# Patient Record
Sex: FEMALE | Employment: UNEMPLOYED | URBAN - METROPOLITAN AREA
[De-identification: names, ages, dates, MRNs, and addresses within clinical notes are randomized per-mention and may not be internally consistent; named-entity substitution may affect disease eponyms.]

---

## 2017-08-04 ENCOUNTER — TELEPHONE (OUTPATIENT)
Dept: TRANSPLANT | Facility: CLINIC | Age: 57
End: 2017-08-04

## 2017-08-04 DIAGNOSIS — Z00.5 TRANSPLANT DONOR EVALUATION: Primary | ICD-10-CM

## 2017-08-04 NOTE — TELEPHONE ENCOUNTER
Sarah Guerrero called and stated that she is interested in becoming a living donor for her brother, Bello Guerrero.  Medical and social history obtained.  No contraindications noted.  All questions answered.  Crossmatch has been scheduled.

## 2017-08-08 ENCOUNTER — LAB VISIT (OUTPATIENT)
Dept: LAB | Facility: HOSPITAL | Age: 57
End: 2017-08-08
Attending: NURSE PRACTITIONER
Payer: MEDICARE

## 2017-08-08 DIAGNOSIS — Z00.5 TRANSPLANT DONOR EVALUATION: ICD-10-CM

## 2017-08-08 LAB — ABO + RH BLD: NORMAL

## 2017-08-08 PROCEDURE — 81373 HLA I TYPING 1 LOCUS LR: CPT | Mod: 91,TXP

## 2017-08-08 PROCEDURE — 81376 HLA II TYPING 1 LOCUS LR: CPT | Mod: TXP

## 2017-08-08 PROCEDURE — 86900 BLOOD TYPING SEROLOGIC ABO: CPT | Mod: TXP

## 2017-08-08 PROCEDURE — 81376 HLA II TYPING 1 LOCUS LR: CPT | Mod: 91,TXP

## 2017-08-08 PROCEDURE — 36415 COLL VENOUS BLD VENIPUNCTURE: CPT | Mod: PO,TXP

## 2017-08-08 PROCEDURE — 81373 HLA I TYPING 1 LOCUS LR: CPT | Mod: TXP

## 2017-08-08 PROCEDURE — 86901 BLOOD TYPING SEROLOGIC RH(D): CPT | Mod: TXP

## 2017-08-21 LAB — HLATY INTERPRETATION: NORMAL

## 2017-08-22 ENCOUNTER — TELEPHONE (OUTPATIENT)
Dept: TRANSPLANT | Facility: CLINIC | Age: 57
End: 2017-08-22

## 2017-08-22 NOTE — TELEPHONE ENCOUNTER
Patient notified that the results of the crossmatch with her brother shows that they are compatible. Explained that she will be a backup at this time and called if needed to proceed with testing. All questions were answered and patient verbalized understanding.

## 2017-08-28 LAB
HLA DRB4 1: NORMAL
HLA SSO DNA TYPING CLASS I & II INTERPRETATION: NORMAL
HLA-A 1 SERO. EQUIV: 2
HLA-A 1: NORMAL
HLA-A 2 SERO. EQUIV: NORMAL
HLA-A 2: NORMAL
HLA-B 1 SERO. EQUIV: 18
HLA-B 1: NORMAL
HLA-B 2 SERO. EQUIV: 44
HLA-B 2: NORMAL
HLA-BW 1 SERO. EQUIV: 6
HLA-BW 2 SERO. EQUIV: 4
HLA-C 1: NORMAL
HLA-C 2: NORMAL
HLA-CW 1 SERO. EQUIV: 5
HLA-CW 2 SERO. EQUIV: NORMAL
HLA-DQ 1 SERO. EQUIV: 4
HLA-DQ 2 SERO. EQUIV: 5
HLA-DQB1 1: NORMAL
HLA-DQB1 2: NORMAL
HLA-DRB1 1 SERO. EQUIV: 8
HLA-DRB1 1: NORMAL
HLA-DRB1 2 SERO. EQUIV: 14
HLA-DRB1 2: NORMAL
HLA-DRB3 1: NORMAL
HLA-DRB3 2: NORMAL
HLA-DRB345 1 SERO. EQUIV: 52
HLA-DRB345 2 SERO. EQUIV: NORMAL
HLA-DRB4 2: NORMAL
HLA-DRB5 1: NORMAL
HLA-DRB5 2: NORMAL
SSDQB TESTING DATE: NORMAL
SSDRB TESTING DATE: NORMAL
SSOA TESTING DATE: NORMAL
SSOB TESTING DATE: NORMAL
SSOC TESTING DATE: NORMAL
SSODR TESTING DATE: NORMAL
TYSSO TESTING DATE: NORMAL

## 2017-09-19 ENCOUNTER — TELEPHONE (OUTPATIENT)
Dept: TRANSPLANT | Facility: CLINIC | Age: 57
End: 2017-09-19

## 2017-09-19 DIAGNOSIS — Z00.5 TRANSPLANT DONOR EVALUATION: Primary | ICD-10-CM

## 2017-09-19 NOTE — TELEPHONE ENCOUNTER
Patient informed that the other donor was not approved. States she would like to proceed with the medical evaluation. Required testing discussed and information provided to schedule appointments. Patient will obtain results of colonoscopy.   All questions were answered and patient verbalized understanding.

## 2017-10-18 ENCOUNTER — APPOINTMENT (OUTPATIENT)
Dept: LAB | Facility: HOSPITAL | Age: 57
End: 2017-10-18
Attending: NURSE PRACTITIONER
Payer: MEDICARE

## 2017-10-18 ENCOUNTER — HOSPITAL ENCOUNTER (OUTPATIENT)
Dept: RADIOLOGY | Facility: HOSPITAL | Age: 57
Discharge: HOME | End: 2017-10-18
Attending: NURSE PRACTITIONER
Payer: MEDICARE

## 2017-10-18 DIAGNOSIS — Z00.5 TRANSPLANT DONOR EVALUATION: ICD-10-CM

## 2017-10-18 PROCEDURE — 86698 HISTOPLASMA ANTIBODY: CPT | Mod: TXP

## 2017-10-18 PROCEDURE — 71020 XR CHEST PA AND LATERAL: CPT | Mod: 26,TXP,, | Performed by: RADIOLOGY

## 2017-10-18 PROCEDURE — 71020 XR CHEST PA AND LATERAL: CPT | Mod: TC,PO,TXP

## 2017-10-18 PROCEDURE — 86612 BLASTOMYCES ANTIBODY: CPT | Mod: TXP

## 2017-10-25 ENCOUNTER — TELEPHONE (OUTPATIENT)
Dept: TRANSPLANT | Facility: CLINIC | Age: 57
End: 2017-10-25

## 2017-10-25 NOTE — TELEPHONE ENCOUNTER
Lab results reviewed with Dr. Watson (discussed with Dr. Hensley). Patient not a candidate for donation at this time due to pre-diabetes (Hgba1c 6.1), Hypertension, and obesity.    Patient notified of test results. Informed that she will need to lose 15-20# and have improved glucose tolerance in order to continue testing. Also informed that her potassium was low and the physicians advise that her HCTZ be stopped. Will send patient a copy of testing to review with her PCP. All questions were answered and patient verbalized understanding. Appointments for 11/1/17 cancelled.

## 2023-12-12 ENCOUNTER — TELEPHONE (OUTPATIENT)
Dept: RHEUMATOLOGY | Facility: CLINIC | Age: 63
End: 2023-12-12
Payer: MEDICARE

## 2023-12-12 NOTE — TELEPHONE ENCOUNTER
Left message for patient.  She has never been seen by Rheumatology and do not have an appointment with us.  Give her [phone].

## 2023-12-12 NOTE — TELEPHONE ENCOUNTER
----- Message from María Malave MA sent at 12/12/2023 11:10 AM CST -----  Contact: torsten;@ [phone]  Pt called                In regards to speak with staff to see if she can still be seen on today. Pt stated that her appt was for 11 am and she's not sure of doctor name when I asked.                Call back  [phone]